# Patient Record
Sex: FEMALE | Race: WHITE | NOT HISPANIC OR LATINO | Employment: OTHER | URBAN - METROPOLITAN AREA
[De-identification: names, ages, dates, MRNs, and addresses within clinical notes are randomized per-mention and may not be internally consistent; named-entity substitution may affect disease eponyms.]

---

## 2024-07-30 ENCOUNTER — OFFICE VISIT (OUTPATIENT)
Age: 84
End: 2024-07-30
Payer: COMMERCIAL

## 2024-07-30 ENCOUNTER — APPOINTMENT (OUTPATIENT)
Age: 84
End: 2024-07-30
Payer: COMMERCIAL

## 2024-07-30 VITALS
BODY MASS INDEX: 29.58 KG/M2 | TEMPERATURE: 97 F | HEIGHT: 66 IN | DIASTOLIC BLOOD PRESSURE: 72 MMHG | RESPIRATION RATE: 20 BRPM | WEIGHT: 184.08 LBS | SYSTOLIC BLOOD PRESSURE: 116 MMHG | OXYGEN SATURATION: 97 % | HEART RATE: 110 BPM

## 2024-07-30 DIAGNOSIS — S82.035A CLOSED NONDISPLACED TRANSVERSE FRACTURE OF LEFT PATELLA, INITIAL ENCOUNTER: Primary | ICD-10-CM

## 2024-07-30 DIAGNOSIS — S89.92XA LEFT KNEE INJURY, INITIAL ENCOUNTER: ICD-10-CM

## 2024-07-30 PROCEDURE — S9083 URGENT CARE CENTER GLOBAL: HCPCS

## 2024-07-30 PROCEDURE — 73564 X-RAY EXAM KNEE 4 OR MORE: CPT

## 2024-07-30 PROCEDURE — 99203 OFFICE O/P NEW LOW 30 MIN: CPT

## 2024-07-30 NOTE — PROGRESS NOTES
"[facility]        NAME: Hilda Crawford is a 84 y.o. female  : 1940    MRN: [id number]  DATE: 2024  TIME: 10:04 AM    Assessment and Plan   Closed nondisplaced transverse fracture of left patella, initial encounter [S82.035A]  1. Closed nondisplaced transverse fracture of left patella, initial encounter  XR knee 4+ vw left injury    Ambulatory Referral to Orthopedic Surgery            Patient Instructions   Preliminary reading of left knee X-ray: There is an area suspicious for an acute fracture along lateral aspect of the epicondyle of your femur as well as the inferior aspect of the patella.  Radiologist will have final reading- \"Nondisplaced patellar fracture.\"    Knee immobilizer to the left knee for comfort and support - can remove to shower and sleep.  Use walker to help you get around - nonweightbearing until you see orthopedics  Ice to affected area first 48 hours, heat afterwards. 15 minutes every 3 hours as needed throughout the day  Topical pain medication such as icy/hot, Biofreeze, Salon pas, etc.  Ibuprofen - 600 mg orally every 6-8 hours when required, maximum 2400 mg/day OR Naproxen - 500 mg orally twice daily when required, maximum 1250 mg/day    Acetaminophen - 650 mg orally every 4-6 hours when required, maximum 4000 mg/day    Follow up with orthopedics- Call [phone] to make an appointment    If tests have been performed at [facility], our office will contact you with results if changes need to be made to the care plan discussed with you at the visit.  You can review your full results on [facility] MyChart.    Chief Complaint     Chief Complaint   Patient presents with   • Knee Pain     LEFT sided knee pain after a mechanical fall yesterday. Fell on a tile floor. Fell forward. Denies other injuries. Denies HS/BT/LOC.          History of Present Illness       Left foot got caught on a crack and she fell forward landing on her left knee.  Did not strike her head or lose " consciousness.  Normally does not use a walker.  Currently is in the office with her own walker to help assist with her walking. Has pain with movement and palpation to the left knee.    Knee Pain   Pertinent negatives include no numbness.       Review of Systems   Review of Systems   Constitutional:  Negative for chills and fever.   Respiratory:  Negative for cough and shortness of breath.    Cardiovascular:  Negative for chest pain.   Gastrointestinal:  Negative for abdominal pain.   Musculoskeletal:  Positive for arthralgias, gait problem (Due to pain, using walker currently.) and myalgias. Negative for joint swelling.        Left knee   Neurological:  Negative for dizziness, weakness, light-headedness, numbness and headaches.         Current Medications       Current Outpatient Medications:   •  GABAPENTIN PO, Take 100 mg by mouth 3 (three) times a day, Disp: , Rfl:   •  LEVOTHYROXINE SODIUM PO, Take 25 mcg by mouth daily, Disp: , Rfl:   •  MONTELUKAST SODIUM PO, Take 4 mg by mouth daily at bedtime, Disp: , Rfl:   •  SIMVASTATIN PO, Take 5 mg by mouth daily at bedtime, Disp: , Rfl:   •  TORSEMIDE PO, Take 5 mg by mouth daily, Disp: , Rfl:     Current Allergies     Allergies as of 07/30/2024 - Reviewed 07/30/2024   Allergen Reaction Noted   • Nuts - food allergy Throat Swelling 07/30/2024   • Latex Rash 07/30/2024   • Tomato - food allergy Rash 07/30/2024            The following portions of the patient's history were reviewed and updated as appropriate: allergies, current medications, past family history, past medical history, past social history, past surgical history and problem list.     Past Medical History:   Diagnosis Date   • Cancer (HCC)     x3   • High cholesterol    • Neuropathy    • Thyroid disease        Past Surgical History:   Procedure Laterality Date   • CHOLECYSTECTOMY     • EYE SURGERY     • HYSTERECTOMY     • SPLENECTOMY, TOTAL         History reviewed. No pertinent family  "history.      Medications have been verified.        Objective   /72 (BP Location: Left arm, Patient Position: Standing, Cuff Size: Standard)   Pulse (!) 110   Temp (!) 97 °F (36.1 °C) (Tympanic)   Resp 20   Ht 5' 6\" (1.676 m)   Wt 83.5 kg (184 lb 1.4 oz)   SpO2 97%   BMI 29.71 kg/m²   No LMP recorded. Patient has had a hysterectomy.       Physical Exam     Physical Exam  Vitals and nursing note reviewed.   Constitutional:       Appearance: Normal appearance.   HENT:      Head: Normocephalic and atraumatic.   Pulmonary:      Effort: Pulmonary effort is normal.   Musculoskeletal:      Left hip: Normal. No tenderness. Normal range of motion.      Left knee: Swelling and bony tenderness present. Decreased range of motion. Tenderness present over the medial joint line, lateral joint line and ACL.      Left ankle: Normal. No tenderness. Normal range of motion.   Skin:     General: Skin is warm and dry.      Capillary Refill: Capillary refill takes less than 2 seconds.          Neurological:      General: No focal deficit present.      Mental Status: She is alert and oriented to person, place, and time. Mental status is at baseline.      Sensory: No sensory deficit.      Motor: No weakness.   Psychiatric:         Mood and Affect: Mood normal.         Behavior: Behavior normal.         Thought Content: Thought content normal.                   "

## 2024-07-30 NOTE — PATIENT INSTRUCTIONS
"Preliminary reading of left knee X-ray: There is an area suspicious for an acute fracture along lateral aspect of the epicondyle of your femur as well as the inferior aspect of the patella.  Radiologist will have final reading- \"Nondisplaced patellar fracture.\"    Knee immobilizer to the left knee for comfort and support - can remove to shower and sleep.  Use walker to help you get around - nonweightbearing until you see orthopedics  Ice to affected area first 48 hours, heat afterwards. 15 minutes every 3 hours as needed throughout the day  Topical pain medication such as icy/hot, Biofreeze, Salon pas, etc.  Ibuprofen - 600 mg orally every 6-8 hours when required, maximum 2400 mg/day OR Naproxen - 500 mg orally twice daily when required, maximum 1250 mg/day    Acetaminophen - 650 mg orally every 4-6 hours when required, maximum 4000 mg/day    Follow up with orthopedics- Call [phone] to make an appointment    If tests have been performed at [facility], our office will contact you with results if changes need to be made to the care plan discussed with you at the visit.  You can review your full results on [facility] MyChart.  "